# Patient Record
Sex: MALE | Race: WHITE | NOT HISPANIC OR LATINO | Employment: UNEMPLOYED | URBAN - METROPOLITAN AREA
[De-identification: names, ages, dates, MRNs, and addresses within clinical notes are randomized per-mention and may not be internally consistent; named-entity substitution may affect disease eponyms.]

---

## 2020-11-04 PROBLEM — Q61.3 PKD (POLYCYSTIC KIDNEY DISEASE): Status: ACTIVE | Noted: 2020-11-04

## 2020-11-04 PROBLEM — I26.99 PULMONARY EMBOLISM: Status: ACTIVE | Noted: 2020-11-04

## 2020-11-04 PROBLEM — R07.9 CHEST PAIN: Status: ACTIVE | Noted: 2020-11-04

## 2020-11-04 PROBLEM — I82.461 ACUTE DEEP VEIN THROMBOSIS (DVT) OF CALF MUSCLE VEIN OF RIGHT LOWER EXTREMITY: Status: ACTIVE | Noted: 2020-11-04

## 2020-12-10 PROBLEM — R07.9 CHEST PAIN: Status: RESOLVED | Noted: 2020-11-04 | Resolved: 2020-12-10

## 2020-12-10 PROBLEM — I10 ESSENTIAL HYPERTENSION: Chronic | Status: ACTIVE | Noted: 2020-12-10

## 2021-07-01 ENCOUNTER — PATIENT MESSAGE (OUTPATIENT)
Dept: ADMINISTRATIVE | Facility: OTHER | Age: 36
End: 2021-07-01

## 2022-03-01 PROBLEM — F41.9 ANXIETY: Status: ACTIVE | Noted: 2022-03-01

## 2023-04-10 PROBLEM — S02.413B: Status: ACTIVE | Noted: 2023-04-10

## 2023-04-10 PROBLEM — S02.413D: Status: ACTIVE | Noted: 2023-04-10

## 2023-04-10 PROBLEM — S02.411D: Status: ACTIVE | Noted: 2023-04-10

## 2023-04-10 PROBLEM — S02.31XB: Status: ACTIVE | Noted: 2023-04-10

## 2023-04-19 PROBLEM — S02.413A: Status: ACTIVE | Noted: 2023-04-19

## 2023-10-12 PROBLEM — Z79.01 ANTICOAGULANT LONG-TERM USE: Status: ACTIVE | Noted: 2023-10-12

## 2023-10-18 PROBLEM — S02.85XS: Status: ACTIVE | Noted: 2023-10-18

## 2023-10-18 PROBLEM — H05.401: Status: ACTIVE | Noted: 2023-10-18

## 2023-10-18 PROBLEM — H53.2 DIPLOPIA: Status: ACTIVE | Noted: 2023-10-18

## 2024-01-20 ENCOUNTER — ON-DEMAND VIRTUAL (OUTPATIENT)
Dept: URGENT CARE | Facility: CLINIC | Age: 39
End: 2024-01-20
Payer: MEDICAID

## 2024-01-20 DIAGNOSIS — Z91.199 NO-SHOW FOR APPOINTMENT: Primary | ICD-10-CM

## 2024-01-20 PROCEDURE — 99499 UNLISTED E&M SERVICE: CPT | Mod: 95,,, | Performed by: NURSE PRACTITIONER

## 2024-01-21 NOTE — PROGRESS NOTES
Patient no-showed today's appointment; appointment was for 09:45 pm. Patient contacted and did not come into the video.

## 2024-03-22 ENCOUNTER — ON-DEMAND VIRTUAL (OUTPATIENT)
Dept: URGENT CARE | Facility: CLINIC | Age: 39
End: 2024-03-22
Payer: MEDICAID

## 2024-03-23 NOTE — PROGRESS NOTES
Patient is located in [place].  I explained that he will need to do a video visit in [place] or go to urgent care in the am.   This encounter was created in error - please disregard.